# Patient Record
Sex: FEMALE | Race: BLACK OR AFRICAN AMERICAN | NOT HISPANIC OR LATINO
[De-identification: names, ages, dates, MRNs, and addresses within clinical notes are randomized per-mention and may not be internally consistent; named-entity substitution may affect disease eponyms.]

---

## 2017-12-14 PROBLEM — Z00.00 ENCOUNTER FOR PREVENTIVE HEALTH EXAMINATION: Status: ACTIVE | Noted: 2017-12-14

## 2017-12-20 ENCOUNTER — APPOINTMENT (OUTPATIENT)
Dept: CARDIOLOGY | Facility: CLINIC | Age: 57
End: 2017-12-20
Payer: MEDICAID

## 2017-12-20 PROCEDURE — 93015 CV STRESS TEST SUPVJ I&R: CPT

## 2018-01-15 ENCOUNTER — APPOINTMENT (OUTPATIENT)
Dept: CARDIOLOGY | Facility: CLINIC | Age: 58
End: 2018-01-15
Payer: MEDICAID

## 2018-01-15 PROCEDURE — 93000 ELECTROCARDIOGRAM COMPLETE: CPT

## 2018-01-15 PROCEDURE — 99214 OFFICE O/P EST MOD 30 MIN: CPT

## 2020-10-27 ENCOUNTER — RESULT REVIEW (OUTPATIENT)
Age: 60
End: 2020-10-27

## 2020-11-30 ENCOUNTER — RESULT REVIEW (OUTPATIENT)
Age: 60
End: 2020-11-30

## 2021-03-14 ENCOUNTER — TRANSCRIPTION ENCOUNTER (OUTPATIENT)
Age: 61
End: 2021-03-14

## 2021-03-29 ENCOUNTER — TRANSCRIPTION ENCOUNTER (OUTPATIENT)
Age: 61
End: 2021-03-29

## 2024-04-08 ENCOUNTER — APPOINTMENT (OUTPATIENT)
Dept: ORTHOPEDIC SURGERY | Facility: CLINIC | Age: 64
End: 2024-04-08
Payer: MEDICARE

## 2024-04-08 VITALS — HEIGHT: 67 IN | BODY MASS INDEX: 27.15 KG/M2 | WEIGHT: 173 LBS

## 2024-04-08 DIAGNOSIS — I10 ESSENTIAL (PRIMARY) HYPERTENSION: ICD-10-CM

## 2024-04-08 DIAGNOSIS — M54.16 RADICULOPATHY, LUMBAR REGION: ICD-10-CM

## 2024-04-08 DIAGNOSIS — M43.16 SPONDYLOLISTHESIS, LUMBAR REGION: ICD-10-CM

## 2024-04-08 DIAGNOSIS — M51.36 OTHER INTERVERTEBRAL DISC DEGENERATION, LUMBAR REGION: ICD-10-CM

## 2024-04-08 PROCEDURE — 99203 OFFICE O/P NEW LOW 30 MIN: CPT

## 2024-04-08 PROCEDURE — 72100 X-RAY EXAM L-S SPINE 2/3 VWS: CPT

## 2024-04-08 PROCEDURE — 73503 X-RAY EXAM HIP UNI 4/> VIEWS: CPT | Mod: RT

## 2024-04-08 RX ORDER — MELOXICAM 15 MG/1
15 TABLET ORAL
Qty: 30 | Refills: 0 | Status: ACTIVE | COMMUNITY
Start: 2024-04-08 | End: 1900-01-01

## 2024-04-08 RX ORDER — ENALAPRIL MALEATE 5 MG/1
TABLET ORAL
Refills: 0 | Status: ACTIVE | COMMUNITY

## 2024-04-08 RX ORDER — ROSUVASTATIN CALCIUM 5 MG/1
TABLET, FILM COATED ORAL
Refills: 0 | Status: ACTIVE | COMMUNITY

## 2024-04-08 RX ORDER — METOPROLOL TARTRATE 75 MG/1
TABLET, FILM COATED ORAL
Refills: 0 | Status: ACTIVE | COMMUNITY

## 2024-04-08 NOTE — IMAGING
[Disc space narrowing] : Disc space narrowing [Spondylolithesis] : Spondylolithesis [FreeTextEntry1] : at l5-s1 [de-identified] : normal ortho, + fibroid in uterus

## 2024-04-08 NOTE — HISTORY OF PRESENT ILLNESS
[de-identified] :  04/08/2024: She is for evaluation of 1 year history of right leg pain radiating down the right leg worse with prolonged sitting.  Denies injury.  She saw her PCP who advised Motrin and Ortho consult.  She denies specific back pain or groin pain  Denies contributory past medical history  She works in a factory [] : no [FreeTextEntry5] : patient states she's been experiencing on and off pain for the past 2 years following a fall. Numbness/tingling down the right leg into the foot occasionally.

## 2024-04-08 NOTE — ASSESSMENT
[FreeTextEntry1] : Will start her on Mobic Will get her into physical therapy Follow-up 6 weeks if pain persist would consider MRI

## 2024-05-20 ENCOUNTER — APPOINTMENT (OUTPATIENT)
Dept: ORTHOPEDIC SURGERY | Facility: CLINIC | Age: 64
End: 2024-05-20

## 2025-06-18 ENCOUNTER — OFFICE (OUTPATIENT)
Dept: URBAN - METROPOLITAN AREA CLINIC 63 | Facility: CLINIC | Age: 65
Setting detail: OPHTHALMOLOGY
End: 2025-06-18
Payer: MEDICARE

## 2025-06-18 DIAGNOSIS — H25.813: ICD-10-CM

## 2025-06-18 DIAGNOSIS — H52.4: ICD-10-CM

## 2025-06-18 DIAGNOSIS — H11.153: ICD-10-CM

## 2025-06-18 PROBLEM — H43.393 VITREOUS FLOATERS; BOTH EYES: Status: ACTIVE | Noted: 2025-06-18

## 2025-06-18 PROCEDURE — 92004 COMPRE OPH EXAM NEW PT 1/>: CPT | Performed by: INTERNAL MEDICINE

## 2025-06-18 PROCEDURE — 92015 DETERMINE REFRACTIVE STATE: CPT | Performed by: INTERNAL MEDICINE

## 2025-06-18 ASSESSMENT — KERATOMETRY
OD_AXISANGLE_DEGREES: 072
OS_K2POWER_DIOPTERS: 43.00
OS_AXISANGLE_DEGREES: 090
OD_K2POWER_DIOPTERS: 43.25
OS_K1POWER_DIOPTERS: 43.00
OD_K1POWER_DIOPTERS: 42.75

## 2025-06-18 ASSESSMENT — REFRACTION_AUTOREFRACTION
OD_AXIS: 093
OS_SPHERE: +2.00
OD_SPHERE: +1.75
OS_CYLINDER: -0.75
OD_CYLINDER: -0.50
OS_AXIS: 062

## 2025-06-18 ASSESSMENT — REFRACTION_MANIFEST
OS_CYLINDER: -0.75
OS_ADD: +2.50
OD_ADD: +2.50
OD_CYLINDER: -0.50
OU_VA: 20/20
OD_VA1: 20/20
OS_VA1: 20/20
OS_SPHERE: +2.00
OS_AXIS: 062
OD_AXIS: 093
OD_SPHERE: +1.75

## 2025-06-18 ASSESSMENT — CONFRONTATIONAL VISUAL FIELD TEST (CVF)
OD_FINDINGS: FULL
OS_FINDINGS: FULL

## 2025-06-18 ASSESSMENT — VISUAL ACUITY
OD_BCVA: 20/100-1
OS_BCVA: 20/80-1

## 2025-06-18 ASSESSMENT — TONOMETRY
OD_IOP_MMHG: 16
OS_IOP_MMHG: 15